# Patient Record
Sex: MALE | Race: WHITE | URBAN - METROPOLITAN AREA
[De-identification: names, ages, dates, MRNs, and addresses within clinical notes are randomized per-mention and may not be internally consistent; named-entity substitution may affect disease eponyms.]

---

## 2018-02-26 ENCOUNTER — OFFICE VISIT (OUTPATIENT)
Dept: PSYCHIATRY | Facility: CLINIC | Age: 43
End: 2018-02-26
Attending: NURSE PRACTITIONER
Payer: COMMERCIAL

## 2018-02-26 VITALS — HEART RATE: 112 BPM | SYSTOLIC BLOOD PRESSURE: 151 MMHG | DIASTOLIC BLOOD PRESSURE: 96 MMHG | WEIGHT: 224.6 LBS

## 2018-02-26 DIAGNOSIS — F41.1 GAD (GENERALIZED ANXIETY DISORDER): Primary | ICD-10-CM

## 2018-02-26 PROCEDURE — G0463 HOSPITAL OUTPT CLINIC VISIT: HCPCS | Mod: ZF

## 2018-02-26 RX ORDER — HYDROXYZINE PAMOATE 25 MG/1
CAPSULE ORAL
Qty: 120 CAPSULE | Refills: 0 | Status: SHIPPED | OUTPATIENT
Start: 2018-02-26 | End: 2018-03-16

## 2018-02-26 RX ORDER — CITALOPRAM HYDROBROMIDE 40 MG/1
40 TABLET ORAL DAILY
Qty: 30 TABLET | Refills: 0 | Status: SHIPPED | OUTPATIENT
Start: 2018-02-26 | End: 2018-03-26

## 2018-02-26 ASSESSMENT — PAIN SCALES - GENERAL: PAINLEVEL: NO PAIN (0)

## 2018-02-26 ASSESSMENT — ANXIETY QUESTIONNAIRES
7. FEELING AFRAID AS IF SOMETHING AWFUL MIGHT HAPPEN: SEVERAL DAYS
5. BEING SO RESTLESS THAT IT IS HARD TO SIT STILL: MORE THAN HALF THE DAYS
3. WORRYING TOO MUCH ABOUT DIFFERENT THINGS: SEVERAL DAYS
1. FEELING NERVOUS, ANXIOUS, OR ON EDGE: MORE THAN HALF THE DAYS
2. NOT BEING ABLE TO STOP OR CONTROL WORRYING: MORE THAN HALF THE DAYS
6. BECOMING EASILY ANNOYED OR IRRITABLE: MORE THAN HALF THE DAYS
GAD7 TOTAL SCORE: 11
IF YOU CHECKED OFF ANY PROBLEMS ON THIS QUESTIONNAIRE, HOW DIFFICULT HAVE THESE PROBLEMS MADE IT FOR YOU TO DO YOUR WORK, TAKE CARE OF THINGS AT HOME, OR GET ALONG WITH OTHER PEOPLE: SOMEWHAT DIFFICULT

## 2018-02-26 ASSESSMENT — PATIENT HEALTH QUESTIONNAIRE - PHQ9: 5. POOR APPETITE OR OVEREATING: SEVERAL DAYS

## 2018-02-26 NOTE — NURSING NOTE
Chief Complaint   Patient presents with     Eval/Assessment     ADHD, medication check     Reviewed Allergies, Medications, Pharmacy, Smoking Status, and Pain Level  Administered Abuse Screening Questions   Obtained Weight, Blood Pressure, Heart Rate

## 2018-02-26 NOTE — MR AVS SNAPSHOT
After Visit Summary   2018    Guilherme Fagan    MRN: [id number]           Patient Information     Date Of Birth          1975        Visit Information        Provider Department      2018 8:00 AM Chaz Tafoya APRN CNP Psychiatry Clinic        Today's Diagnoses     ABHI (generalized anxiety disorder)    -  1       Follow-ups after your visit        Your next 10 appointments already scheduled     Mar 26, 2018  9:00 AM CDT   Adult Med Follow UP with DEUCE Alvares CNP   Psychiatry Clinic ([facility])    [address]  [address] [id number] [phone]              Who to contact     Please call your clinic at [phone] to:    Ask questions about your health    Make or cancel appointments    Discuss your medicines    Learn about your test results    Speak to your doctor            Additional Information About Your Visit        MyChart Information     [employer] is an electronic gateway that provides easy, online access to your medical records. With LegalSherpa, you can request a clinic appointment, read your test results, renew a prescription or communicate with your care team.     To sign up for [employer] visit the website at [email or web address]   You will be asked to enter the access code listed below, as well as some personal information. Please follow the directions to create your username and password.     Your access code is: [email or web address]  Expires: 2018  3:51 PM     Your access code will  in 90 days. If you need help or a new code, please contact your [facility] or call [phone] for assistance.        Care EveryWhere ID     This is your Care EveryWhere ID. This could be used by other organizations to access your [facility] medical records  [id number]        Your Vitals Were     Pulse                   112            Blood Pressure from Last 3 Encounters:   18 (!)  151/96    Weight from Last 3 Encounters:   02/26/18 101.9 kg (224 lb 9.6 oz)              Today, you had the following     No orders found for display         Today's Medication Changes          These changes are accurate as of 2/26/18 11:59 PM.  If you have any questions, ask your nurse or doctor.               Start taking these medicines.        Dose/Directions    hydrOXYzine 25 MG capsule   Commonly known as:  VISTARIL   Used for:  ABHI (generalized anxiety disorder)   Started by:  Chaz Tafoya APRN CNP        Take 1-2 capsules (25-50mg) up to 3 times per day for anxiety   Quantity:  120 capsule   Refills:  0         These medicines have changed or have updated prescriptions.        Dose/Directions    citalopram 40 MG tablet   Commonly known as:  celeXA   This may have changed:    - medication strength  - how much to take   Used for:  ABHI (generalized anxiety disorder)   Changed by:  Chaz Tafoya APRN CNP        Dose:  40 mg   Take 1 tablet (40 mg) by mouth daily   Quantity:  30 tablet   Refills:  0            Where to get your medicines      These medications were sent to [facility] [address] [place], [place] - [address], [place] [address]     Phone:  [phone]     citalopram 40 MG tablet    hydrOXYzine 25 MG capsule                Primary Care Provider Fax #    Physician No Ref-Primary [phone]       No address on file        Equal Access to Services     [facility] : David bryant Sofrancesco, waaxda luqadaha, qaybta kaalmada adeegyada, antonio noguera . So [facility] [phone].    ATENCIÓN: Si habla español, tiene a khoury disposición servicios gratuitos de asistencia lingüística. Llame al [phone].    We comply with applicable federal civil rights laws and [place] laws. We do not discriminate on the basis of race, color, national origin, age, disability, sex, sexual orientation, or gender identity.            Thank you!     Thank you  for choosing PSYCHIATRY CLINIC  for your care. Our goal is always to provide you with excellent care. Hearing back from our patients is one way we can continue to improve our services. Please take a few minutes to complete the written survey that you may receive in the mail after your visit with us. Thank you!             Your Updated Medication List - Protect others around you: Learn how to safely use, store and throw away your medicines at www.disposemymeds.org.          This list is accurate as of 2/26/18 11:59 PM.  Always use your most recent med list.                   Brand Name Dispense Instructions for use Diagnosis    ALPRAZOLAM PO      Take 0.5 mg by mouth daily as needed for anxiety        citalopram 40 MG tablet    celeXA    30 tablet    Take 1 tablet (40 mg) by mouth daily    ABHI (generalized anxiety disorder)       hydrOXYzine 25 MG capsule    VISTARIL    120 capsule    Take 1-2 capsules (25-50mg) up to 3 times per day for anxiety    ABHI (generalized anxiety disorder)

## 2018-02-26 NOTE — PROGRESS NOTES
"  Psychiatry Clinic Medical Diagnostic Assessment               Guilherme Fagan is a 43 year old male who presents to the clinic for initial psychiatric evaluation   Therapist: None  PCP: No primary care provider on file.  Other Providers: None  Referred by self referral for evaluation of depression and anxiety.      History was provided by patient who was a good historian.     Chief Complaint                                                                                                             \" Been getting psychiatric medications from general practitioner for years for anxiety and time to see a specialist \"     History of Present Illness                                                                                 4, 4   Psych critical item history includes no critical psych history.  However, prescribed both Xanax and Norco  .       Pertinent Background:  Guilherme reports he had difficulty remaining focused while in high school but college was not as problematic due to more free/open structure.  At times Guilherme has experienced symptoms of depression.  Guilherme sought help for possible anxiety approximately 15 years ago during life transition.  Celexa was started by his PCP and has been taking regularly since initially prescribed.  Reports Celexa was initially helpful. Does not endorse a single episode of Major Depressive Disorder.  His hopeless appears to stem from episodes of significant anxiety.      Most recent history:  No significant exasperation or intensification of symptoms.  States he has started to wonder if medications he is currently taking are appropriate and effective.  Also curious if diagnosis is accurate.  Guilherme wonders if he is possibly experiencing ADHD.  He endorses being inpatient, frequently leaves meeting due to possible boredom/disinterest and need for extra stimulation.  Has difficulty sitting for extended periods of time and has trouble concentrating/focus.  He is unsure if this behavior is " due to anxiety.  Outside of work, he also has difficutly maintaining conversation with his wife which is not problem per se but has been discussed.  His disinterest and lack of interest also extends to social situations.  For example, Guilherme enjoys music and will look forward to performances but when actually attending he looks forward to the performance to end.  It appears these situations are not due to inability to find pleasure as he looks forward to and enjoys mountain biking.      Currently, he reports sleeping well with occasional difficulty falling asleep.  Appetite described as good.  Reports needs to be constantly moving.  Anxiety is exhausting which has impacted his energy level.  Does not endorse symptoms of depression currently.     When anxiety is exasperated, Guilherme experiences increased heart rate, SOB, increased perspiration, and paraesthesia in extremities    During evaluation, Guilherme was quite fidgety and on two occasions he had to leave his chair to walk to window.      No mouna, psychosis, OCD, eating disorders.    Last took Xanax 0.5mg, which was prescribed by PCP, several weeks ago.  It was effective but possibly too effective.  Would take couple times a week when got home from stressful day of work.  Guilherme would like to find an alternative to Xanax for the management of intense episodes of anxiety.  Guilherme also prescribed Hydrocodone-Acetaminophen 5-325mg for chronic back pain from skiing injury several years ago.  Last seen by PCP on 12/6/17 and plan is to begin Norco taper.  Both Xanax and Norco prescriptions given on 12/6/17.  Guilherme states he is not taking Norco currently.      Recent Symptoms:   Depression:  depressed mood, anhedonia, low energy, insomnia, poor concentration /memory and psychomotor changes [restlessness]  Elevated:  none  Psychosis:  none  Anxiety:  excessive worry and nervous/overwhelmed  Panic Attack:  SOB, trouble taking deep breath, chest tightness, palpitations, diaphoresis  and extremity paresthesia  Trauma Related:  none       Recent Substance Use:  Alcohol- Consumes 4-6 drinks per week at most  Tobacco- quit smoking approximately 18 years ago       Caffeine- 2-3 cups/day of coffee   Opioids- prescribed opioids in past.  Last used 2 months ago        Narcan Kit- N/A  Cannabis- none currently but used in high school and college   Other Illicit Drugs- none     Substance Use History                                                                 None        Psychiatric History     None      Psychiatric Medication Trials     Celexa (citalopram)  Xanax (alprazolam)                                                       OR this and delete the other    Drug /  Start Date Dose (mg) Helpful Adverse Effects   DC Reason / Date                          Social/ Family History               [per patient report]                                                  1ea, 1ea     FINANCIAL SUPPORT- working       CHILDREN- 2 children.  Son: 14.  Daughter: 10       LIVING SITUATION- Living in [place], [place] with wife and 2 kids      LEGAL- None  EARLY HISTORY/ EDUCATION- Raised in [place].  Graduated from high school.  Attended [employer] and received degree in sociology.  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  Currently works in marketing.  SOCIAL/ SPIRITUAL SUPPORT- Reports has support system but rarely reaches out.       CULTURAL INFLUENCES/ IMPACT- does not associate with any particular culture       TRAUMA HISTORY (self-report)- None  FEELS SAFE AT HOME- Yes  FAMILY HISTORY-  Not aware of family mental health issues    Medical / Surgical History                                                                                                                   There is no problem list on file for this patient.      No past surgical history on file.     Medical Review of Systems                                                                                                      2, 10     A comprehensive review of systems was performed and is negative other than noted in the HPI.    Allergy                                Review of patient's allergies indicates not on file.  Current Medications                                                                                                         Current Outpatient Prescriptions   Medication Sig Dispense Refill     Citalopram Hydrobromide (CELEXA PO) Take 20 mg by mouth daily       Vitals                                                                                                                         3, 3     BP (!) 151/96  Pulse 112  Wt 101.9 kg (224 lb 9.6 oz)     Mental Status Exam                                                                                      9, 14 cog gs     Alertness: alert  and oriented  Appearance: casually groomed  Behavior/Demeanor: cooperative and pleasant, with good  eye contact   Speech: normal  Language: intact  Psychomotor: restless, sits forward or near front of chair and fidgety  Mood: description consistent with euthymia  Affect: full range; was congruent to mood; was congruent to content  Thought Process/Associations: unremarkable  Thought Content:  Reports none;  Denies suicidal ideation, violent ideation, delusions and paranoid ideation  Perception:  Reports none;  Denies auditory hallucinations and visual hallucinations  Insight: good  Judgment: good  Cognition: (6) does  appear grossly intact; formal cognitive testing was not done  Gait and Station: unremarkable    Labs and Data                                                                                                                     Rating Scales:   PHQ9 and ABHI-7  ABHI-7 Today: 11  PHQ9 Today:  9  No flowsheet data found.      No lab results found.  No lab results found.    Diagnosis and Assessment                                                                             m2, h3     Today the following issues were  addressed:    1) Generalized Anxiety Disorder  2) R/O Major Depressive Disorder  3) R/O Panic Disorder    [place] Prescription Monitoring Program [] was checked today:  indicates Xanax 0.5mg 30/15 filled 12/6/17 and Norco 5-325mg filled 12/6/17.    PSYCHOTROPIC DRUG INTERACTIONS: Celexa-Hydroxyzine: Concurrent use of CITALOPRAM and HYDROXYZINE may result in increased risk of QT interval prolongation. .    Plan                                                                                                                     m2, h3     1) Management of baseline anxiety  Therapy- provided education on how consistent psychotherapy can help with management of anxiety  Medication-   Increase Celexa to 40mg daily for increased management of baseline anxiety    If minimal improvement, consider switching to another SSRI.    2) Management of episodes of intense anxiety  Therapy- provided education on how consistent psychotherapy can help with the management of anxiety  Medication-   Start Hydroxyzine 25-50mg TID PRN    Guilherme has not used Xanax in several weeks and has not experienced any withdrawal.  Advised him to stop medication.  It is unclear as to whether he will or not.  Also provided education/information on danger of taking Xanax and Norco together.        RTC: 1 month    CRISIS NUMBERS:   Provided routinely in AVS.    Treatment Risk Statement:  The patient understands the risks, benefits, adverse effects and alternatives. Agrees to treatment with the capacity to do so. No medical contraindications to treatment. Agrees to call clinic for any problems. The patient understands to call 911 or go to the nearest ED if life threatening or urgent symptoms occur.     WHODAS 2.0  TODAY total score = N/A; [a 12-item WHODAS 2.0 assessment was not completed by the pt today and/or recorded in EPIC].     PROVIDER:  DEUCE Pizarro CNP

## 2018-03-13 ASSESSMENT — PATIENT HEALTH QUESTIONNAIRE - PHQ9: SUM OF ALL RESPONSES TO PHQ QUESTIONS 1-9: 9

## 2018-03-13 ASSESSMENT — ANXIETY QUESTIONNAIRES: GAD7 TOTAL SCORE: 11

## 2018-03-16 DIAGNOSIS — F41.1 GAD (GENERALIZED ANXIETY DISORDER): ICD-10-CM

## 2018-03-16 RX ORDER — HYDROXYZINE PAMOATE 25 MG/1
CAPSULE ORAL
Qty: 120 CAPSULE | Refills: 0 | Status: SHIPPED | OUTPATIENT
Start: 2018-03-16 | End: 2018-03-26

## 2018-03-16 NOTE — TELEPHONE ENCOUNTER
Medication requested: Hydroxyzine 25 mg caps  Last refilled: 2-26-18  Qty: 120      Last seen: 2-26-+18  RTC: 1 mo  Cancel: 0  No-show: 0  Next appt: 3-26-18    Refill decision:   Refilled for 30 days per protocol.  (120 tabs as last ordered)    Kathleen M Doege RN

## 2018-03-26 ENCOUNTER — OFFICE VISIT (OUTPATIENT)
Dept: PSYCHIATRY | Facility: CLINIC | Age: 43
End: 2018-03-26
Attending: NURSE PRACTITIONER
Payer: COMMERCIAL

## 2018-03-26 VITALS — DIASTOLIC BLOOD PRESSURE: 88 MMHG | HEART RATE: 87 BPM | WEIGHT: 218.8 LBS | SYSTOLIC BLOOD PRESSURE: 130 MMHG

## 2018-03-26 DIAGNOSIS — F41.1 GAD (GENERALIZED ANXIETY DISORDER): ICD-10-CM

## 2018-03-26 PROCEDURE — G0463 HOSPITAL OUTPT CLINIC VISIT: HCPCS | Mod: ZF

## 2018-03-26 RX ORDER — CITALOPRAM HYDROBROMIDE 40 MG/1
40 TABLET ORAL DAILY
Qty: 30 TABLET | Refills: 1 | Status: SHIPPED | OUTPATIENT
Start: 2018-03-26

## 2018-03-26 RX ORDER — PROPRANOLOL HYDROCHLORIDE 10 MG/1
TABLET ORAL
Qty: 90 TABLET | Refills: 0 | Status: SHIPPED | OUTPATIENT
Start: 2018-03-26

## 2018-03-26 ASSESSMENT — PAIN SCALES - GENERAL: PAINLEVEL: NO PAIN (0)

## 2018-03-26 NOTE — MR AVS SNAPSHOT
After Visit Summary   3/26/2018    Guilherme Fagan    MRN: [id number]           Patient Information     Date Of Birth          1975        Visit Information        Provider Department      3/26/2018 9:00 AM Chaz Tafoya APRN [facility]        Today's Diagnoses     ABHI (generalized anxiety disorder)           Follow-ups after your visit        Who to contact     Please call your clinic at [phone] to:    Ask questions about your health    Make or cancel appointments    Discuss your medicines    Learn about your test results    Speak to your doctor            Additional Information About Your Visit        MyChart Information     ViewReple is an electronic gateway that provides easy, online access to your medical records. With ViewReple, you can request a clinic appointment, read your test results, renew a prescription or communicate with your care team.     To sign up for Diaphonicst visit the website at [email or web address]   You will be asked to enter the access code listed below, as well as some personal information. Please follow the directions to create your username and password.     Your access code is: [email or web address]  Expires: 2018  3:51 PM     Your access code will  in 90 days. If you need help or a new code, please contact your [facility] or call [phone] for assistance.        Care EveryWhere ID     This is your Care EveryWhere ID. This could be used by other organizations to access your [facility] medical records  [id number]        Your Vitals Were     Pulse                   87            Blood Pressure from Last 3 Encounters:   18 130/88   18 (!) 151/96    Weight from Last 3 Encounters:   18 99.2 kg (218 lb 12.8 oz)   18 101.9 kg (224 lb 9.6 oz)              Today, you had the following     No orders found for display         Today's Medication Changes          These changes are accurate as of 3/26/18   9:42 AM.  If you have any questions, ask your nurse or doctor.               Start taking these medicines.        Dose/Directions    propranolol 10 MG tablet   Commonly known as:  INDERAL   Used for:  ABHI (generalized anxiety disorder)   Started by:  Chaz Tafoya APRN CNP        Take 1 tablet (10mg) up to 3 times per day as needed for anxiety   Quantity:  90 tablet   Refills:  0         Stop taking these medicines if you haven't already. Please contact your care team if you have questions.     hydrOXYzine 25 MG capsule   Commonly known as:  VISTARIL   Stopped by:  Chaz Tafoya APRN CNP                Where to get your medicines      These medications were sent to [facility] [address] [place] [facility] [address] [facility]  [address] [facility] [address]     Phone:  [phone]     citalopram 40 MG tablet    propranolol 10 MG tablet                Primary Care Provider Fax #    Physician No Ref-Primary [phone]       No address on file        Equal Access to Services     [facility] : Hadii leny maldonadoo Sofrancesco, waaxda luqadaha, qaybta kaalmada adeyvetteyada, antonio noguera . So [facility] [phone].    ATENCIÓN: Si habla español, tiene a khoury disposición servicios gratuitos de asistencia lingüística. Llame al [phone].    We comply with applicable federal civil rights laws and [place] laws. We do not discriminate on the basis of race, color, national origin, age, disability, sex, sexual orientation, or gender identity.            Thank you!     Thank you for choosing PSYCHIATRY CLINIC  for your care. Our goal is always to provide you with excellent care. Hearing back from our patients is one way we can continue to improve our services. Please take a few minutes to complete the written survey that you may receive in the mail after your visit with us. Thank you!             Your Updated Medication List - Protect others around you: Learn how to safely use, store and  throw away your medicines at www.disposemymeds.org.          This list is accurate as of 3/26/18  9:42 AM.  Always use your most recent med list.                   Brand Name Dispense Instructions for use Diagnosis    ALPRAZOLAM PO      Take 0.5 mg by mouth daily as needed for anxiety        citalopram 40 MG tablet    celeXA    30 tablet    Take 1 tablet (40 mg) by mouth daily    ABHI (generalized anxiety disorder)       propranolol 10 MG tablet    INDERAL    90 tablet    Take 1 tablet (10mg) up to 3 times per day as needed for anxiety    ABHI (generalized anxiety disorder)

## 2018-03-26 NOTE — NURSING NOTE
Chief Complaint   Patient presents with     RECHECK     ABHI     Reviewed allergies, smoking status, pharmacy preference and medications  Obtained weight, blood pressure and heart rate

## 2018-03-26 NOTE — PROGRESS NOTES
"  Psychiatry Clinic Progress Note                                                                   Guilherme Fagan is a 43 year old male who returns to the clinic for follow-up care.  Therapist: None  PCP: No Ref-Primary, Physician  Other Providers: None    Pertinent Background:  See previous notes.  Psych critical item history includes [no critical items].     Interim History                                                                                                        4, 4     The patient is a good historian, reports good treatment adherence and was last seen 2/26/18.  Since the last visit Guilherme reports improvement in mood since increasing Celexa to 40mg daily.  Unable to describe how mood has improved but is confident it has.  No concerns with side effects.  Guilherme reports the hydroxyzine was unpleasant and too sedating.  Felt \"drugged.\"  Medication will be discontinued.  Continues to experience periodic anxiety that Guilherme characterizes as \"predictable.\"  He is not interested in taking Buspar or Gabapentin due to dosing requirements.      Guilherme also reports he has scheduled initial ADHD testing. He continues to endorse concerns regarding attention issues.      Recent Symptoms:   Depression:  depressed mood, anhedonia, low energy and poor concentration /memory  Elevated:  none  Psychosis:  none  Anxiety:  excessive worry and nervous/overwhelmed  Panic Attack:  none  Trauma Related:  none     Recent Substance Use:  no changes reported        Social/ Family History                                  [per patient report]                                 1ea,1ea   FINANCIAL SUPPORT- working       FEELS SAFE AT HOME- Yes    Medical / Surgical History                                                                                                                There is no problem list on file for this patient.      No past surgical history on file.     Medical Review of Systems                                             "                                                        2,10   A comprehensive review of systems was performed and is negative other than noted in the HPI.  Allergy                                Review of patient's allergies indicates no known allergies.  Current Medications                                                                                                       Current Outpatient Prescriptions   Medication Sig Dispense Refill     hydrOXYzine (VISTARIL) 25 MG capsule Take 1-2 capsules (25-50mg) up to 3 times per day for anxiety 120 capsule 0     ALPRAZOLAM PO Take 0.5 mg by mouth daily as needed for anxiety       citalopram (CELEXA) 40 MG tablet Take 1 tablet (40 mg) by mouth daily 30 tablet 0     Vitals                                                                                                                       3, 3   /88  Pulse 87  Wt 99.2 kg (218 lb 12.8 oz)   Mental Status Exam                                                                                    9, 14 cog gs     Alertness: alert  and oriented  Appearance: casually groomed  Behavior/Demeanor: cooperative and pleasant, with good  eye contact   Speech: normal and regular rate and rhythm  Language: intact  Psychomotor: normal or unremarkable  Mood: description consistent with euthymia  Affect: full range; was congruent to mood; was congruent to content  Thought Process/Associations: unremarkable  Thought Content:  Reports none;  Denies suicidal ideation, violent ideation, delusions and paranoid ideation  Perception:  Reports none;  Denies auditory hallucinations and visual hallucinations  Insight: good  Judgment: good  Cognition: (6) does  appear grossly intact; formal cognitive testing was not done  Gait/Station and/or Muscle Strength/Tone: unremarkable    Labs and Data                                                                                                                 Rating Scales:    PHQ9    PHQ9 Today:   5  PHQ-9 SCORE 2/26/2018   Total Score 9         Diagnosis and Assessment                                                                             m2, h3     Today the following issues were addressed:    1) Generalized Anxiety Disorder  2) R/O Major Depressive Disorder  3) R/O Panic Disorder     [place] Prescription Monitoring Program [] was checked today:  indicates Xanax 0.5mg 30/15 filled 12/6/17 and Norco 5-325mg filled 12/6/17.    PSYCHOTROPIC DRUG INTERACTIONS: none clinically relevant    Plan                                                                                                                    m2, h3      1) Management of baseline anxiety  Therapy- provided education on how consistent psychotherapy can help with management of anxiety  Medication-   Continue Celexa to 40mg daily for increased management of baseline anxiety    If minimal improvement, consider switching to another SSRI.    Discussed Buspar and Gabapentin.  Guilherme was not interested due to dosing twice per day.       2) Management of episodes of intense anxiety  Therapy- provided education on how consistent psychotherapy can help with the management of anxiety  Medication-   Discontinue Hydroxyzine 25-50mg TID PRN  Start Propranolol 10mg TID PRN     Guilherme has not used Xanax in several weeks and has not experienced any withdrawal.  Advised him to stop medication.  It is unclear as to whether he will or not.  Also provided education/information on danger of taking Xanax and Norco together.      RTC: Guilherme will schedule next appointment after his ADHD testing has been completed which should be within 2 months.    CRISIS NUMBERS:   Provided routinely in AVS.    Treatment Risk Statement:  The patient understands the risks, benefits, adverse effects and alternatives. Agrees to treatment with the capacity to do so. No medical contraindications to treatment. Agrees to call clinic for any problems. The patient understands to call 911 or go to the  nearest ED if life threatening or urgent symptoms occur.      PROVIDER:  DEUCE Pizarro CNP

## 2018-03-27 ASSESSMENT — PATIENT HEALTH QUESTIONNAIRE - PHQ9: SUM OF ALL RESPONSES TO PHQ QUESTIONS 1-9: 5

## 2021-04-25 ENCOUNTER — HEALTH MAINTENANCE LETTER (OUTPATIENT)
Age: 46
End: 2021-04-25

## 2021-10-10 ENCOUNTER — HEALTH MAINTENANCE LETTER (OUTPATIENT)
Age: 46
End: 2021-10-10

## 2022-05-21 ENCOUNTER — HEALTH MAINTENANCE LETTER (OUTPATIENT)
Age: 47
End: 2022-05-21

## 2022-09-18 ENCOUNTER — HEALTH MAINTENANCE LETTER (OUTPATIENT)
Age: 47
End: 2022-09-18

## 2023-06-04 ENCOUNTER — HEALTH MAINTENANCE LETTER (OUTPATIENT)
Age: 48
End: 2023-06-04